# Patient Record
Sex: FEMALE | Race: WHITE
[De-identification: names, ages, dates, MRNs, and addresses within clinical notes are randomized per-mention and may not be internally consistent; named-entity substitution may affect disease eponyms.]

---

## 2019-03-20 ENCOUNTER — HOSPITAL ENCOUNTER (EMERGENCY)
Dept: HOSPITAL 25 - UCCORT | Age: 29
Discharge: HOME | End: 2019-03-20
Payer: COMMERCIAL

## 2019-03-20 VITALS — DIASTOLIC BLOOD PRESSURE: 79 MMHG | SYSTOLIC BLOOD PRESSURE: 153 MMHG

## 2019-03-20 DIAGNOSIS — B34.9: Primary | ICD-10-CM

## 2019-03-20 DIAGNOSIS — M79.10: ICD-10-CM

## 2019-03-20 DIAGNOSIS — J02.9: ICD-10-CM

## 2019-03-20 LAB
FLUAV RNA SPEC QL NAA+PROBE: NEGATIVE
FLUBV RNA SPEC QL NAA+PROBE: NEGATIVE

## 2019-03-20 PROCEDURE — G0463 HOSPITAL OUTPT CLINIC VISIT: HCPCS

## 2019-03-20 PROCEDURE — 99212 OFFICE O/P EST SF 10 MIN: CPT

## 2019-03-20 PROCEDURE — 87651 STREP A DNA AMP PROBE: CPT

## 2019-03-20 NOTE — UC
FLU HPI





- HPI Summary


HPI Summary: 





Pt c/o sudden onset fever, chills, body aches, ST X 1 day. 





- History of Current Complaint


Chief Complaint: UCGeneralIllness


Stated Complaint: SORE THROAT,CONGESTION


Time Seen by Provider: 03/20/19 17:07


Hx Obtained From: Patient


Hx Last Menstrual Period: 3/10/19


Pregnant?: No


Onset/Duration: Sudden Onset, Still Present


Severity Currently: Moderate


Severity Initially: Moderate


Pain Intensity: 7


Pain Scale Used: 0-10 Numeric


Associated Signs & Symptoms: Positive: Fever, Myalgia, Sore Throat


Related Hx: Possible Flu/Infectious Exposure





- Risk Factors


Influenza Risk Factors: Negative





- Allergy/Home Medications


Allergies/Adverse Reactions: 


 Allergies











Allergy/AdvReac Type Severity Reaction Status Date / Time


 


No Known Allergies Allergy   Verified 03/20/19 17:04














PMH/Surg Hx/FS Hx/Imm Hx


Previously Healthy: Yes





- Surgical History


Surgical History: Yes


Surgery Procedure, Year, and Place: EAR TUBES.  ADDITIONAL B/L EAR SX





- Family History


Known Family History: Positive: Cardiac Disease





- Social History


Occupation: Employed Full-time


Lives: With Family


Alcohol Use: Occasionally


Substance Use Type: None


Smoking Status (MU): Never Smoked Tobacco


Have You Smoked in the Last Year: No





Review of Systems


All Other Systems Reviewed And Are Negative: Yes


Constitutional: Positive: Fever, Chills, Fatigue


Skin: Positive: Negative


Eyes: Positive: Negative


ENT: Positive: Sore Throat


Respiratory: Positive: Negative


Cardiovascular: Positive: Negative


Gastrointestinal: Positive: Negative


Genitourinary: Positive: Negative


Motor: Positive: Negative


Neurovascular: Positive: Negative


Musculoskeletal: Positive: Myalgia


Neurological: Positive: Negative


Psychological: Positive: Negative


Is Patient Immunocompromised?: No





Physical Exam


Triage Information Reviewed: Yes


Appearance: Ill-Appearing


Vital Signs: 


 Initial Vital Signs











Temp  100.7 F   03/20/19 17:00


 


Pulse  120   03/20/19 17:00


 


Resp  17   03/20/19 17:00


 


BP  153/79   03/20/19 17:00


 


Pulse Ox  100   03/20/19 17:00











Vital Signs Reviewed: Yes


Eye Exam: Normal


ENT Exam: Normal


Dental Exam: Normal


Neck exam: Normal


Respiratory Exam: Normal


Cardiovascular Exam: Normal


Musculoskeletal Exam: Normal


Neurological Exam: Normal


Psychological Exam: Normal


Skin Exam: Normal





Flu Course/Dx





- Differential Dx/Diagnosis


Differential Diagnosis/HQI/PQRI: Influenza, Upper Respiratory Infection


Provider Diagnosis: 


 Viral syndrome








Discharge





- Sign-Out/Discharge


Documenting (check all that apply): Patient Departure


All imaging exams completed and their final reports reviewed: No Studies





- Discharge Plan


Condition: Stable


Disposition: HOME


Prescriptions: 


Ibuprofen TAB* [Motrin TAB* 600 MG] 600 mg PO Q8H PRN #15 tab


 PRN Reason: Pain


Patient Education Materials:  Viral Syndrome (ED)


Referrals: 


Laura Andrew MD [Primary Care Provider] - If Needed





- Billing Disposition and Condition


Condition: STABLE


Disposition: Home





- Attestation Statements


Provider Attestation: 





Per institutional requirements, I have reviewed the chart, however, I was not 

consulted specifically or made aware of this patient by the  midlevel provider.

  I did not personally evaluate, interact with , or disposition  this patient.

## 2019-03-24 ENCOUNTER — HOSPITAL ENCOUNTER (EMERGENCY)
Dept: HOSPITAL 25 - UCCORT | Age: 29
Discharge: HOME | End: 2019-03-24
Payer: COMMERCIAL

## 2019-03-24 VITALS — SYSTOLIC BLOOD PRESSURE: 133 MMHG | DIASTOLIC BLOOD PRESSURE: 75 MMHG

## 2019-03-24 DIAGNOSIS — R09.89: ICD-10-CM

## 2019-03-24 DIAGNOSIS — J03.90: Primary | ICD-10-CM

## 2019-03-24 DIAGNOSIS — H66.92: ICD-10-CM

## 2019-03-24 DIAGNOSIS — R09.81: ICD-10-CM

## 2019-03-24 PROCEDURE — 99212 OFFICE O/P EST SF 10 MIN: CPT

## 2019-03-24 PROCEDURE — G0463 HOSPITAL OUTPT CLINIC VISIT: HCPCS

## 2019-03-24 PROCEDURE — 86308 HETEROPHILE ANTIBODY SCREEN: CPT

## 2019-03-24 PROCEDURE — 36415 COLL VENOUS BLD VENIPUNCTURE: CPT

## 2019-03-24 PROCEDURE — 85025 COMPLETE CBC W/AUTO DIFF WBC: CPT

## 2019-03-24 NOTE — UC
Throat Pain/Nasal Kirby HPI





- HPI Summary


HPI Summary: 





29 yo female ill x 2 weeks


nasal congest


malaise


fatigue


sore throat x 1 weeks


now with left otalgia and decreased hearing





- History of Current Complaint


Chief Complaint: UCRespiratory


Stated Complaint: LEFT EAR PAIN


Time Seen by Provider: 03/24/19 21:04


Hx Obtained From: Patient


Hx Last Menstrual Period: 3/12/19


Onset/Duration: Sudden Onset, Lasting Weeks


Severity: Severe


Pain Intensity: 10


Pain Scale Used: 0-10 Numeric


Cough: Nonproductive


Associated Signs & Symptoms: Positive: Sinus Discomfort, Nasal Discharge





- Epiglottits Risk Factors


Epiglottis Risk Factors: Negative





- Allergies/Home Medications


Allergies/Adverse Reactions: 


 Allergies











Allergy/AdvReac Type Severity Reaction Status Date / Time


 


No Known Allergies Allergy   Verified 03/24/19 20:48











Home Medications: 


 Home Medications





Ibuprofen TAB* [Motrin TAB* 600 MG] 1,200 mg PO Q6H PRN 03/24/19 [History 

Confirmed 03/24/19]











PMH/Surg Hx/FS Hx/Imm Hx


Previously Healthy: Yes





- Surgical History


Surgical History: Yes


Surgery Procedure, Year, and Place: EAR TUBES.  ADDITIONAL B/L EAR SX





- Family History


Known Family History: Positive: Cardiac Disease, Non-Contributory





- Social History


Alcohol Use: Occasionally


Substance Use Type: None


Smoking Status (MU): Never Smoked Tobacco


Have You Smoked in the Last Year: No





Review of Systems


All Other Systems Reviewed And Are Negative: Yes


Constitutional: Positive: Fever - none x days, Chills, Fatigue


Skin: Positive: Negative


Eyes: Positive: Negative


ENT: Positive: Sore Throat, Ear Ache, Nasal Discharge, Sinus Congestion


Respiratory: Positive: Cough


Cardiovascular: Positive: Negative


Gastrointestinal: Positive: Negative


Genitourinary: Positive: Negative


Motor: Positive: Negative


Neurovascular: Positive: Negative


Musculoskeletal: Positive: Negative


Neurological: Positive: Negative


Psychological: Positive: Negative





Physical Exam


Triage Information Reviewed: Yes


Appearance: Well-Appearing, No Pain Distress, Well-Nourished


Vital Signs: 


 Initial Vital Signs











Temp  98.5 F   03/24/19 20:50


 


Pulse  80   03/24/19 20:50


 


Resp  20   03/24/19 20:50


 


BP  133/75   03/24/19 20:50


 


Pulse Ox  99   03/24/19 20:50











Vital Signs Reviewed: Yes


Eyes: Positive: Conjunctiva Clear


ENT: Positive: Pharyngeal erythema, Tonsillar swelling, Tonsillar exudate, 

Uvula midline.  Negative: Hearing grossly normal - decreased hearing left ear, 

Nasal congestion, Nasal drainage, Trismus, Muffled voice, Hoarse voice, Dental 

tenderness, Sinus tenderness


Neck: Positive: Supple, Enlarged Nodes @ - ant cerv


Respiratory: Positive: Lungs clear, Normal breath sounds, No respiratory 

distress, No accessory muscle use


Cardiovascular: Positive: RRR, No Murmur


Musculoskeletal: Positive: ROM Intact, No Edema


Neurological: Positive: Alert


Psychological Exam: Normal


Skin Exam: Normal





Throat Pain/Nasal Course/Dx





- Differential Dx/Diagnosis


Provider Diagnosis: 


 Left otitis media, Exudative tonsillitis








Discharge





- Sign-Out/Discharge


Documenting (check all that apply): Patient Departure


All imaging exams completed and their final reports reviewed: No Studies





- Discharge Plan


Condition: Stable


Disposition: HOME


Prescriptions: 


Amoxicillin PO (*) [Amoxicillin 875 MG (*)] 875 mg PO BID #14 tab


Patient Education Materials:  Ear Infection (ED), Tonsillitis (ED)


Forms:  *Work Release


Referrals: 


Laura Andrew MD [Primary Care Provider] - 4 Days (if not better)


Additional Instructions: 


blood count and mono test pending








- Billing Disposition and Condition


Condition: STABLE


Disposition: Home

## 2019-03-25 LAB
BASOPHILS # BLD AUTO: 0.1 10^3/UL (ref 0–0.2)
EOSINOPHIL # BLD AUTO: 0.2 10^3/UL (ref 0–0.6)
HCT VFR BLD AUTO: 40 % (ref 33–41)
HGB BLD-MCNC: 13 G/DL (ref 12–16)
LYMPHOCYTES # BLD AUTO: 2.1 10^3/UL (ref 1–4.8)
MCH RBC QN AUTO: 26 PG (ref 27–31)
MCHC RBC AUTO-ENTMCNC: 33 G/DL (ref 31–36)
MCV RBC AUTO: 81 FL (ref 80–97)
MONOCYTES # BLD AUTO: 0.8 10^3/UL (ref 0–0.8)
NEUTROPHILS # BLD AUTO: 9.7 10^3/UL (ref 1.5–7.7)
NRBC # BLD AUTO: 0 10^3/UL
NRBC BLD QL AUTO: 0
PLATELET # BLD AUTO: 310 10^3/UL (ref 150–450)
RBC # BLD AUTO: 4.95 10^6 /UL (ref 3.7–4.87)
WBC # BLD AUTO: 12.9 10^3/UL (ref 3.5–10.8)